# Patient Record
Sex: FEMALE | Race: WHITE | NOT HISPANIC OR LATINO | URBAN - METROPOLITAN AREA
[De-identification: names, ages, dates, MRNs, and addresses within clinical notes are randomized per-mention and may not be internally consistent; named-entity substitution may affect disease eponyms.]

---

## 2021-05-24 ENCOUNTER — LAB OUTSIDE AN ENCOUNTER (OUTPATIENT)
Dept: URBAN - METROPOLITAN AREA CLINIC 19 | Facility: CLINIC | Age: 71
End: 2021-05-24

## 2021-05-24 ENCOUNTER — WEB ENCOUNTER (OUTPATIENT)
Dept: URBAN - METROPOLITAN AREA CLINIC 19 | Facility: CLINIC | Age: 71
End: 2021-05-24

## 2021-05-24 ENCOUNTER — OFFICE VISIT (OUTPATIENT)
Dept: URBAN - METROPOLITAN AREA CLINIC 19 | Facility: CLINIC | Age: 71
End: 2021-05-24
Payer: MEDICARE

## 2021-05-24 DIAGNOSIS — R19.7 DIARRHEA: ICD-10-CM

## 2021-05-24 DIAGNOSIS — Z80.0 FAMILY HISTORY OF COLON CANCER: ICD-10-CM

## 2021-05-24 DIAGNOSIS — R10.32 LLQ PAIN: ICD-10-CM

## 2021-05-24 DIAGNOSIS — R11.0 NAUSEA: ICD-10-CM

## 2021-05-24 DIAGNOSIS — E66.01 MORBID OBESITY: ICD-10-CM

## 2021-05-24 PROCEDURE — 99203 OFFICE O/P NEW LOW 30 MIN: CPT | Performed by: INTERNAL MEDICINE

## 2021-05-24 RX ORDER — LEVOTHYROXINE SODIUM 100 UG/1
1 TABLET IN THE MORNING ON AN EMPTY STOMACH TABLET ORAL ONCE A DAY
Status: ACTIVE | COMMUNITY

## 2021-05-24 RX ORDER — METFORMIN HYDROCHLORIDE 1000 MG/1
1 TABLET WITH A MEAL TABLET, FILM COATED ORAL ONCE A DAY
Status: ACTIVE | COMMUNITY

## 2021-05-24 RX ORDER — ONDANSETRON HYDROCHLORIDE 4 MG/1
1-2 TABLETS TABLET, FILM COATED ORAL
Qty: 60 | Refills: 1 | OUTPATIENT
Start: 2021-05-24

## 2021-05-24 RX ORDER — AMLODIPINE AND OLMESARTAN MEDOXOMIL 5; 20 MG/1; MG/1
1 TABLET TABLET ORAL ONCE A DAY
Status: ACTIVE | COMMUNITY

## 2021-05-24 RX ORDER — PRAVASTATIN SODIUM 40 MG/1
1 TABLET TABLET ORAL ONCE A DAY
Status: ACTIVE | COMMUNITY

## 2021-05-24 RX ORDER — SERTRALINE 100 MG/1
1 TABLET TABLET, FILM COATED ORAL ONCE A DAY
Status: ACTIVE | COMMUNITY

## 2021-05-24 RX ORDER — SODIUM, POTASSIUM,MAG SULFATES 17.5-3.13G
254 ML SOLUTION, RECONSTITUTED, ORAL ORAL ONCE
Qty: 1 KIT | Refills: 0 | OUTPATIENT
Start: 2021-05-24 | End: 2021-05-25

## 2021-05-24 RX ORDER — FUROSEMIDE 40 MG/1
1 TABLET TABLET ORAL ONCE A DAY
Status: ACTIVE | COMMUNITY

## 2021-05-24 NOTE — PHYSICAL EXAM CHEST:
no lesions,  no deformities,  no traumatic injuries, chest wall non-tender,  no masses present, breathing is unlabored without accessory muscle use, normal breath sounds
Yes

## 2021-05-24 NOTE — HPI-TODAY'S VISIT:
5/16/14 (Dr. Sadia Larson):  The patient is a 63-year-old woman here for complaints of left lower quadrant abdominal pain and hematochezia. She had a colonoscopy in 2007 by Dr. Kamaljit Morgan. It showed mild diverticulosis in the sigmoid colon and grade 2 internal hemorrhoids were found. The patient has had chronic left lower quadrant abdominal pain for over a year. She also had an attack of diverticulitis a couple of years ago when she was on  antibiotics orally for a week. Over the past year, she has also noticed her stools are thinner and  she has some discomfort in the left lower quadrant immediately before and after defecation. The pain is constant and gets worse with bowel movements. She denies any fevers or chills recently.  She had left knee replacement in April 2014 and at that time, her stools became harder. She has  also been on narcotics as well as NSAIDs, namely Voltaren as well as Aleve. She is still on both, but she has been cut back on her narcotics as well as NSAIDs.   The patient is quite concerned about any colonoscopy procedures since she developed an acute  right-sided hemiplegia the day after her colonoscopy and she was diagnosed with an ischemic stroke. She was found to have carotid artery stenosis with atheromatous plaques. She has been  on aspirin since then. She underwent her knee surgery under general anesthesia last month and  did not develop any neurological symptoms. She was able to stop her aspirin safely without any issues. However, her general anesthesia was complicated by hypotension as well as hypoxia that stabilized after few hours as per the patient.  5/25/21:  On 6/4/14, Dr. Larson performed a colonoscopy at the hospital that showed diverticulosis and hemorrhoids but no other lesions.  There were no complications.  She presents today for a 1-week history of diarrhea, usually after eating.  This has been associated with LLQ/left flank pain.  She has had some nausea.  Of note, Dr. Nj performed an EGD on 6/11/14 that showed some gastritis/peptic ulcers - thought to be secondary to bile reflux.  There was also some evidence of GERD.  Patient has been on a PPI for the past 2 weeks.  She is due for a screening colonoscopy due to a family history of colon cancer.

## 2021-05-26 LAB
A/G RATIO: 2.2
ALBUMIN: 4.7
ALKALINE PHOSPHATASE: 135
ALT (SGPT): 26
AST (SGOT): 23
BILIRUBIN, TOTAL: 0.5
BUN/CREATININE RATIO: 17
BUN: 16
C-REACTIVE PROTEIN, QUANT: 2
CALCIUM: 9.5
CARBON DIOXIDE, TOTAL: 22
CHLORIDE: 104
CREATININE: 0.92
EGFR IF AFRICN AM: 74
EGFR IF NONAFRICN AM: 64
ENDOMYSIAL ANTIBODY IGA: NEGATIVE
GLOBULIN, TOTAL: 2.1
GLUCOSE: 97
HEMATOCRIT: 38.6
HEMOGLOBIN: 12.5
IMMUNOGLOBULIN A, QN, SERUM: 206
MCH: 26.2
MCHC: 32.4
MCV: 81
NRBC: (no result)
PLATELETS: 369
POTASSIUM: 4.7
PROTEIN, TOTAL: 6.8
RBC: 4.78
RDW: 15.8
SEDIMENTATION RATE-WESTERGREN: 18
SODIUM: 141
T-TRANSGLUTAMINASE (TTG) IGA: <2
WBC: 9.7

## 2021-05-27 LAB — GASTROINTESTINAL PATHOGEN: (no result)

## 2021-06-09 ENCOUNTER — OFFICE VISIT (OUTPATIENT)
Dept: URBAN - METROPOLITAN AREA MEDICAL CENTER 25 | Facility: MEDICAL CENTER | Age: 71
End: 2021-06-09

## 2021-06-09 ENCOUNTER — OFFICE VISIT (OUTPATIENT)
Dept: URBAN - METROPOLITAN AREA MEDICAL CENTER 25 | Facility: MEDICAL CENTER | Age: 71
End: 2021-06-09
Payer: MEDICARE

## 2021-06-09 DIAGNOSIS — K63.89 BACTERIAL OVERGROWTH SYNDROME: ICD-10-CM

## 2021-06-09 DIAGNOSIS — Z80.0 FAMILY HISTORY MALIGNANT NEOPLASM OF BILIARY TRACT: ICD-10-CM

## 2021-06-09 PROCEDURE — 45380 COLONOSCOPY AND BIOPSY: CPT | Performed by: INTERNAL MEDICINE

## 2021-06-09 RX ORDER — FUROSEMIDE 40 MG/1
1 TABLET TABLET ORAL ONCE A DAY
Status: ACTIVE | COMMUNITY

## 2021-06-09 RX ORDER — METFORMIN HYDROCHLORIDE 1000 MG/1
1 TABLET WITH A MEAL TABLET, FILM COATED ORAL ONCE A DAY
Status: ACTIVE | COMMUNITY

## 2021-06-09 RX ORDER — ONDANSETRON HYDROCHLORIDE 4 MG/1
1-2 TABLETS TABLET, FILM COATED ORAL
Qty: 60 | Refills: 1 | Status: ACTIVE | COMMUNITY
Start: 2021-05-24

## 2021-06-09 RX ORDER — SERTRALINE 100 MG/1
1 TABLET TABLET, FILM COATED ORAL ONCE A DAY
Status: ACTIVE | COMMUNITY

## 2021-06-09 RX ORDER — LEVOTHYROXINE SODIUM 100 UG/1
1 TABLET IN THE MORNING ON AN EMPTY STOMACH TABLET ORAL ONCE A DAY
Status: ACTIVE | COMMUNITY

## 2021-06-09 RX ORDER — AMLODIPINE AND OLMESARTAN MEDOXOMIL 5; 20 MG/1; MG/1
1 TABLET TABLET ORAL ONCE A DAY
Status: ACTIVE | COMMUNITY

## 2021-06-09 RX ORDER — PRAVASTATIN SODIUM 40 MG/1
1 TABLET TABLET ORAL ONCE A DAY
Status: ACTIVE | COMMUNITY

## 2021-06-14 ENCOUNTER — TELEPHONE ENCOUNTER (OUTPATIENT)
Dept: URBAN - METROPOLITAN AREA CLINIC 19 | Facility: CLINIC | Age: 71
End: 2021-06-14

## 2021-06-14 RX ORDER — ONDANSETRON HYDROCHLORIDE 4 MG/1
1-2 TABLETS TABLET, FILM COATED ORAL
Qty: 60 | Refills: 1 | Status: ACTIVE | COMMUNITY
Start: 2021-05-24

## 2021-06-14 RX ORDER — PRAVASTATIN SODIUM 40 MG/1
1 TABLET TABLET ORAL ONCE A DAY
Status: ACTIVE | COMMUNITY

## 2021-06-14 RX ORDER — LEVOTHYROXINE SODIUM 100 UG/1
1 TABLET IN THE MORNING ON AN EMPTY STOMACH TABLET ORAL ONCE A DAY
Status: ACTIVE | COMMUNITY

## 2021-06-14 RX ORDER — AMLODIPINE AND OLMESARTAN MEDOXOMIL 5; 20 MG/1; MG/1
1 TABLET TABLET ORAL ONCE A DAY
Status: ACTIVE | COMMUNITY

## 2021-06-14 RX ORDER — METFORMIN HYDROCHLORIDE 1000 MG/1
1 TABLET WITH A MEAL TABLET, FILM COATED ORAL ONCE A DAY
Status: ACTIVE | COMMUNITY

## 2021-06-14 RX ORDER — RIFAXIMIN 550 MG/1
1 TABLET TABLET ORAL THREE TIMES A DAY
Qty: 42 TABLET | Refills: 0 | OUTPATIENT
Start: 2021-06-22 | End: 2021-07-06

## 2021-06-14 RX ORDER — SERTRALINE 100 MG/1
1 TABLET TABLET, FILM COATED ORAL ONCE A DAY
Status: ACTIVE | COMMUNITY

## 2021-06-14 RX ORDER — FUROSEMIDE 40 MG/1
1 TABLET TABLET ORAL ONCE A DAY
Status: ACTIVE | COMMUNITY

## 2021-08-23 ENCOUNTER — OFFICE VISIT (OUTPATIENT)
Dept: URBAN - METROPOLITAN AREA CLINIC 19 | Facility: CLINIC | Age: 71
End: 2021-08-23
Payer: MEDICARE

## 2021-08-23 ENCOUNTER — TELEPHONE ENCOUNTER (OUTPATIENT)
Dept: URBAN - METROPOLITAN AREA CLINIC 19 | Facility: CLINIC | Age: 71
End: 2021-08-23

## 2021-08-23 DIAGNOSIS — R19.7 DIARRHEA: ICD-10-CM

## 2021-08-23 DIAGNOSIS — R10.32 LLQ PAIN: ICD-10-CM

## 2021-08-23 DIAGNOSIS — R10.2 SUPRAPUBIC ABDOMINAL PAIN: ICD-10-CM

## 2021-08-23 DIAGNOSIS — K21.9 GERD: ICD-10-CM

## 2021-08-23 DIAGNOSIS — E66.01 MORBID OBESITY: ICD-10-CM

## 2021-08-23 DIAGNOSIS — K58.9 IBS (IRRITABLE BOWEL SYNDROME)-DIARRHEA: ICD-10-CM

## 2021-08-23 DIAGNOSIS — Z80.0 FAMILY HISTORY OF COLON CANCER: ICD-10-CM

## 2021-08-23 PROBLEM — 301716002 LEFT LOWER QUADRANT PAIN: Status: ACTIVE | Noted: 2021-05-24

## 2021-08-23 PROBLEM — 162053006 SUPRAPUBIC PAIN: Status: ACTIVE | Noted: 2021-08-23

## 2021-08-23 PROBLEM — 10743008 IRRITABLE BOWEL SYNDROME: Status: ACTIVE | Noted: 2021-06-22

## 2021-08-23 PROBLEM — 235595009 GASTROESOPHAGEAL REFLUX DISEASE: Status: ACTIVE | Noted: 2021-08-23

## 2021-08-23 PROBLEM — 62315008 DIARRHEA: Status: ACTIVE | Noted: 2021-05-24

## 2021-08-23 PROBLEM — 238136002 MORBID OBESITY: Status: ACTIVE | Noted: 2021-05-24

## 2021-08-23 PROCEDURE — 99214 OFFICE O/P EST MOD 30 MIN: CPT | Performed by: INTERNAL MEDICINE

## 2021-08-23 RX ORDER — ONDANSETRON HYDROCHLORIDE 4 MG/1
1-2 TABLETS TABLET, FILM COATED ORAL
Qty: 60 | Refills: 1 | Status: ACTIVE | COMMUNITY
Start: 2021-05-24

## 2021-08-23 RX ORDER — METFORMIN HYDROCHLORIDE 1000 MG/1
1 TABLET WITH A MEAL TABLET, FILM COATED ORAL ONCE A DAY
Status: ACTIVE | COMMUNITY

## 2021-08-23 RX ORDER — PRAVASTATIN SODIUM 40 MG/1
1 TABLET TABLET ORAL ONCE A DAY
Status: ACTIVE | COMMUNITY

## 2021-08-23 RX ORDER — DICYCLOMINE HYDROCHLORIDE 10 MG/1
1 CAPSULE CAPSULE ORAL THREE TIMES A DAY
Qty: 270 CAPSULE | Refills: 1 | OUTPATIENT
Start: 2021-08-23 | End: 2022-02-18

## 2021-08-23 RX ORDER — SERTRALINE 100 MG/1
1 TABLET TABLET, FILM COATED ORAL ONCE A DAY
Status: ACTIVE | COMMUNITY

## 2021-08-23 RX ORDER — AMLODIPINE AND OLMESARTAN MEDOXOMIL 5; 20 MG/1; MG/1
1 TABLET TABLET ORAL ONCE A DAY
Status: ACTIVE | COMMUNITY

## 2021-08-23 RX ORDER — LEVOTHYROXINE SODIUM 100 UG/1
1 TABLET IN THE MORNING ON AN EMPTY STOMACH TABLET ORAL ONCE A DAY
Status: ACTIVE | COMMUNITY

## 2021-08-23 RX ORDER — FAMOTIDINE 40 MG/1
1 TABLET AT BEDTIME TABLET, FILM COATED ORAL TWICE A DAY
Qty: 180 TABLET | Refills: 1 | OUTPATIENT
Start: 2021-08-23

## 2021-08-23 RX ORDER — FUROSEMIDE 40 MG/1
1 TABLET TABLET ORAL ONCE A DAY
Status: ACTIVE | COMMUNITY

## 2021-08-23 NOTE — HPI-TODAY'S VISIT:
5/16/14 (Dr. Sadia Larson):  The patient is a 63-year-old woman here for complaints of left lower quadrant abdominal pain and hematochezia. She had a colonoscopy in 2007 by Dr. Kamaljit Morgan. It showed mild diverticulosis in the sigmoid colon and grade 2 internal hemorrhoids were found. The patient has had chronic left lower quadrant abdominal pain for over a year. She also had an attack of diverticulitis a couple of years ago when she was on  antibiotics orally for a week. Over the past year, she has also noticed her stools are thinner and  she has some discomfort in the left lower quadrant immediately before and after defecation. The pain is constant and gets worse with bowel movements. She denies any fevers or chills recently.  She had left knee replacement in April 2014 and at that time, her stools became harder. She has  also been on narcotics as well as NSAIDs, namely Voltaren as well as Aleve. She is still on both, but she has been cut back on her narcotics as well as NSAIDs.   The patient is quite concerned about any colonoscopy procedures since she developed an acute  right-sided hemiplegia the day after her colonoscopy and she was diagnosed with an ischemic stroke. She was found to have carotid artery stenosis with atheromatous plaques. She has been  on aspirin since then. She underwent her knee surgery under general anesthesia last month and  did not develop any neurological symptoms. She was able to stop her aspirin safely without any issues. However, her general anesthesia was complicated by hypotension as well as hypoxia that stabilized after few hours as per the patient.  5/25/21:  On 6/4/14, Dr. Larson performed a colonoscopy at the hospital that showed diverticulosis and hemorrhoids but no other lesions.  There were no complications.  She presents today for a 1-week history of diarrhea, usually after eating.  This has been associated with LLQ/left flank pain.  She has had some nausea.  Of note, Dr. Nj performed an EGD on 6/11/14 that showed some gastritis/peptic ulcers - thought to be secondary to bile reflux.  There was also some evidence of GERD.  Patient has been on a PPI for the past 2 weeks.  She is due for a screening colonoscopy due to a family history of colon cancer.  8/23/21:  On 6/9/21, I performed a colonoscopy that revealed sigmoid diverticular disease and two benign colon polyps.  Patient presents for reevaluation of her GI symptoms.  She actually had some relief after she took the prep for the colonoscopy, but she tried the Xifaxan that I prescribed without any real change in her symptoms.  She still has intermittent LLQ/suprapubic pain, and she has urinary frequency, most likely exacerbated by her diuretics, which she takes for severe lower extremity edema.  She has some reflux symptoms, but as long as she takes Protonix, she is OK.  She was told recently to switch to Pepcid due to concerns about long-term use of PPIs.

## 2021-08-28 LAB
ANTIMICROBIAL SUSCEPTIBILITY: (no result)
APPEARANCE: CLEAR
BACTERIA: (no result)
BILIRUBIN: NEGATIVE
CAST TYPE: (no result)
CASTS: (no result)
COMMENT: (no result)
CRYSTAL TYPE: (no result)
CRYSTALS: (no result)
EPITHELIAL CELLS (NON RENAL): (no result)
EPITHELIAL CELLS (RENAL): (no result)
GLUCOSE: NEGATIVE
KETONES: NEGATIVE
Lab: (no result)
MICROSCOPIC EXAMINATION: (no result)
MICROSCOPIC EXAMINATION: (no result)
MUCUS THREADS: (no result)
NITRITE, URINE: NEGATIVE
OCCULT BLOOD: NEGATIVE
PH: 5.5
PROTEIN: NEGATIVE
RBC: (no result)
SPECIFIC GRAVITY: 1.01
TRICHOMONAS: (no result)
URINALYSIS REFLEX: (no result)
URINE CULTURE, ROUTINE: (no result)
URINE-COLOR: YELLOW
UROBILINOGEN,SEMI-QN: 0.2
WBC ESTERASE: (no result)
WBC: (no result)
YEAST: (no result)

## 2021-09-02 ENCOUNTER — TELEPHONE ENCOUNTER (OUTPATIENT)
Dept: URBAN - METROPOLITAN AREA CLINIC 19 | Facility: CLINIC | Age: 71
End: 2021-09-02

## 2021-09-02 PROBLEM — 68566005 URINARY TRACT INFECTION: Status: ACTIVE | Noted: 2021-09-02

## 2021-09-02 RX ORDER — PRAVASTATIN SODIUM 40 MG/1
1 TABLET TABLET ORAL ONCE A DAY
Status: ACTIVE | COMMUNITY

## 2021-09-02 RX ORDER — NITROFURANTOIN MONOHYDRATE/MACROCRYSTALLINE 25; 75 MG/1; MG/1
1 CAPSULE WITH FOOD CAPSULE ORAL
Qty: 10 | Refills: 0 | OUTPATIENT
Start: 2021-09-02 | End: 2021-09-07

## 2021-09-02 RX ORDER — SERTRALINE 100 MG/1
1 TABLET TABLET, FILM COATED ORAL ONCE A DAY
Status: ACTIVE | COMMUNITY

## 2021-09-02 RX ORDER — FAMOTIDINE 40 MG/1
1 TABLET AT BEDTIME TABLET, FILM COATED ORAL TWICE A DAY
Qty: 180 TABLET | Refills: 1 | Status: ACTIVE | COMMUNITY
Start: 2021-08-23

## 2021-09-02 RX ORDER — DICYCLOMINE HYDROCHLORIDE 10 MG/1
1 CAPSULE CAPSULE ORAL THREE TIMES A DAY
Qty: 270 CAPSULE | Refills: 1 | Status: ACTIVE | COMMUNITY
Start: 2021-08-23 | End: 2022-02-18

## 2021-09-02 RX ORDER — METFORMIN HYDROCHLORIDE 1000 MG/1
1 TABLET WITH A MEAL TABLET, FILM COATED ORAL ONCE A DAY
Status: ACTIVE | COMMUNITY

## 2021-09-02 RX ORDER — ONDANSETRON HYDROCHLORIDE 4 MG/1
1-2 TABLETS TABLET, FILM COATED ORAL
Qty: 60 | Refills: 1 | Status: ACTIVE | COMMUNITY
Start: 2021-05-24

## 2021-09-02 RX ORDER — FUROSEMIDE 40 MG/1
1 TABLET TABLET ORAL ONCE A DAY
Status: ACTIVE | COMMUNITY

## 2021-09-02 RX ORDER — AMLODIPINE AND OLMESARTAN MEDOXOMIL 5; 20 MG/1; MG/1
1 TABLET TABLET ORAL ONCE A DAY
Status: ACTIVE | COMMUNITY

## 2021-09-02 RX ORDER — LEVOTHYROXINE SODIUM 100 UG/1
1 TABLET IN THE MORNING ON AN EMPTY STOMACH TABLET ORAL ONCE A DAY
Status: ACTIVE | COMMUNITY

## 2022-04-08 ENCOUNTER — OFFICE VISIT (OUTPATIENT)
Dept: URBAN - METROPOLITAN AREA CLINIC 19 | Facility: CLINIC | Age: 72
End: 2022-04-08
Payer: MEDICARE

## 2022-04-08 DIAGNOSIS — R10.12 LEFT UPPER QUADRANT PAIN: ICD-10-CM

## 2022-04-08 DIAGNOSIS — K21.9 GASTROESOPHAGEAL REFLUX DISEASE, UNSPECIFIED WHETHER ESOPHAGITIS PRESENT: ICD-10-CM

## 2022-04-08 DIAGNOSIS — R19.8 ALTERNATING CONSTIPATION AND DIARRHEA: ICD-10-CM

## 2022-04-08 PROCEDURE — 99214 OFFICE O/P EST MOD 30 MIN: CPT | Performed by: NURSE PRACTITIONER

## 2022-04-08 RX ORDER — AMLODIPINE AND OLMESARTAN MEDOXOMIL 5; 20 MG/1; MG/1
1 TABLET TABLET ORAL ONCE A DAY
Status: ACTIVE | COMMUNITY

## 2022-04-08 RX ORDER — PRAVASTATIN SODIUM 40 MG/1
1 TABLET TABLET ORAL ONCE A DAY
Status: ACTIVE | COMMUNITY

## 2022-04-08 RX ORDER — METFORMIN HYDROCHLORIDE 1000 MG/1
1 TABLET WITH A MEAL TABLET, FILM COATED ORAL ONCE A DAY
Status: ACTIVE | COMMUNITY

## 2022-04-08 RX ORDER — FAMOTIDINE 40 MG/1
1 TABLET TABLET, FILM COATED ORAL TWICE A DAY
Qty: 180 TABLET | Refills: 1 | OUTPATIENT
Start: 2022-04-08

## 2022-04-08 RX ORDER — ONDANSETRON HYDROCHLORIDE 4 MG/1
1-2 TABLETS TABLET, FILM COATED ORAL
Qty: 60 | Refills: 1 | Status: ACTIVE | COMMUNITY
Start: 2021-05-24

## 2022-04-08 RX ORDER — FAMOTIDINE 40 MG/1
1 TABLET AT BEDTIME TABLET, FILM COATED ORAL TWICE A DAY
Qty: 180 TABLET | Refills: 1 | Status: ACTIVE | COMMUNITY
Start: 2021-08-23

## 2022-04-08 RX ORDER — FUROSEMIDE 40 MG/1
1 TABLET TABLET ORAL ONCE A DAY
Status: ACTIVE | COMMUNITY

## 2022-04-08 RX ORDER — LEVOTHYROXINE SODIUM 100 UG/1
1 TABLET IN THE MORNING ON AN EMPTY STOMACH TABLET ORAL ONCE A DAY
Status: ACTIVE | COMMUNITY

## 2022-04-08 RX ORDER — SERTRALINE 100 MG/1
1 TABLET TABLET, FILM COATED ORAL ONCE A DAY
Status: ACTIVE | COMMUNITY

## 2022-04-08 NOTE — HPI-TODAY'S VISIT:
5/16/14 (Dr. Sadia Larson):  The patient is a 63-year-old woman here for complaints of left lower quadrant abdominal pain and hematochezia. She had a colonoscopy in 2007 by Dr. Kamaljit Morgan. It showed mild diverticulosis in the sigmoid colon and grade 2 internal hemorrhoids were found. The patient has had chronic left lower quadrant abdominal pain for over a year. She also had an attack of diverticulitis a couple of years ago when she was on  antibiotics orally for a week. Over the past year, she has also noticed her stools are thinner and  she has some discomfort in the left lower quadrant immediately before and after defecation. The pain is constant and gets worse with bowel movements. She denies any fevers or chills recently.  She had left knee replacement in April 2014 and at that time, her stools became harder. She has  also been on narcotics as well as NSAIDs, namely Voltaren as well as Aleve. She is still on both, but she has been cut back on her narcotics as well as NSAIDs.   The patient is quite concerned about any colonoscopy procedures since she developed an acute  right-sided hemiplegia the day after her colonoscopy and she was diagnosed with an ischemic stroke. She was found to have carotid artery stenosis with atheromatous plaques. She has been  on aspirin since then. She underwent her knee surgery under general anesthesia last month and  did not develop any neurological symptoms. She was able to stop her aspirin safely without any issues. However, her general anesthesia was complicated by hypotension as well as hypoxia that stabilized after few hours as per the patient.  5/25/21:  On 6/4/14, Dr. Larson performed a colonoscopy at the hospital that showed diverticulosis and hemorrhoids but no other lesions.  There were no complications.  She presents today for a 1-week history of diarrhea, usually after eating.  This has been associated with LLQ/left flank pain.  She has had some nausea.  Of note, Dr. Nj performed an EGD on 6/11/14 that showed some gastritis/peptic ulcers - thought to be secondary to bile reflux.  There was also some evidence of GERD.  Patient has been on a PPI for the past 2 weeks.  She is due for a screening colonoscopy due to a family history of colon cancer.  8/23/21 (Dr. Ulloa):  On 6/9/21, I performed a colonoscopy that revealed sigmoid diverticular disease and two benign colon polyps.  Patient presents for reevaluation of her GI symptoms.  She actually had some relief after she took the prep for the colonoscopy, but she tried the Xifaxan that I prescribed without any real change in her symptoms.  She still has intermittent LLQ/suprapubic pain, and she has urinary frequency, most likely exacerbated by her diuretics, which she takes for severe lower extremity edema.  She has some reflux symptoms, but as long as she takes Protonix, she is OK.  She was told recently to switch to Pepcid due to concerns about long-term use of PPIs.  4/8/22: Ms. Reyes presents today for abdominal pain with constipation/diarrhea.  Patient reports she feels like she has a small stick pointing out of the epigastric area of her abdomen.  She reports her entire stomach is sore but pain is the worst in the left upper quadrant.  Patient states she had diverticulitis 8 years ago.  Patient reports she feels like she always has to have a bowel movement and she strains a lot.  She reports her stool is loose when she has a bowel movement.  She states she has bowel movement almost every day.  Patient also does reports she occasionally has accidents.  Patient is currently taking Pepcid 40 mg in the morning and Tums before bed.  She reports she still having issues with reflux at night.

## 2022-04-08 NOTE — PHYSICAL EXAM GASTROINTESTINAL
Abdomen, soft, LUQ tenderness, nondistended, no guarding or rigidity, no masses palpable, normal bowel sounds, Liver and Spleen, no hepatomegaly present, no hepatosplenomegaly,  Rectal, deferred

## 2022-04-11 ENCOUNTER — LAB OUTSIDE AN ENCOUNTER (OUTPATIENT)
Dept: URBAN - METROPOLITAN AREA CLINIC 19 | Facility: CLINIC | Age: 72
End: 2022-04-11

## 2022-04-11 LAB
CREATININE POC: 1
PERFORMING LAB: (no result)

## 2022-05-12 ENCOUNTER — LAB OUTSIDE AN ENCOUNTER (OUTPATIENT)
Dept: URBAN - METROPOLITAN AREA CLINIC 19 | Facility: CLINIC | Age: 72
End: 2022-05-12

## 2022-05-12 ENCOUNTER — OFFICE VISIT (OUTPATIENT)
Dept: URBAN - METROPOLITAN AREA CLINIC 19 | Facility: CLINIC | Age: 72
End: 2022-05-12
Payer: MEDICARE

## 2022-05-12 VITALS
TEMPERATURE: 96.4 F | DIASTOLIC BLOOD PRESSURE: 88 MMHG | WEIGHT: 282.4 LBS | SYSTOLIC BLOOD PRESSURE: 144 MMHG | HEIGHT: 65 IN | BODY MASS INDEX: 47.05 KG/M2

## 2022-05-12 DIAGNOSIS — K21.9 GASTROESOPHAGEAL REFLUX DISEASE, UNSPECIFIED WHETHER ESOPHAGITIS PRESENT: ICD-10-CM

## 2022-05-12 DIAGNOSIS — R11.0 NAUSEA: ICD-10-CM

## 2022-05-12 DIAGNOSIS — R10.12 LUQ PAIN: ICD-10-CM

## 2022-05-12 DIAGNOSIS — R59.0 LYMPHADENOPATHY, RETROPERITONEAL: ICD-10-CM

## 2022-05-12 DIAGNOSIS — K59.09 CONSTIPATION: ICD-10-CM

## 2022-05-12 PROCEDURE — 99214 OFFICE O/P EST MOD 30 MIN: CPT | Performed by: NURSE PRACTITIONER

## 2022-05-12 RX ORDER — HYOSCYAMINE SULFATE 0.125 MG
1 TABLET ON THE TONGUE AND ALLOW TO DISSOLVE  AS NEEDED TABLET,DISINTEGRATING ORAL EVERY 6 HOURS
Qty: 40 | Refills: 1 | OUTPATIENT
Start: 2022-05-12 | End: 2022-06-01

## 2022-05-12 RX ORDER — ONDANSETRON 4 MG/1
1 TABLET ON THE TONGUE AND ALLOW TO DISSOLVE TABLET, ORALLY DISINTEGRATING ORAL
Qty: 20 | OUTPATIENT
Start: 2022-05-12

## 2022-05-12 RX ORDER — LEVOTHYROXINE SODIUM 100 UG/1
1 TABLET IN THE MORNING ON AN EMPTY STOMACH TABLET ORAL ONCE A DAY
Status: ACTIVE | COMMUNITY

## 2022-05-12 RX ORDER — FUROSEMIDE 40 MG/1
1 TABLET TABLET ORAL ONCE A DAY
Status: ACTIVE | COMMUNITY

## 2022-05-12 RX ORDER — PANTOPRAZOLE SODIUM 40 MG/1
1 TABLET TABLET, DELAYED RELEASE ORAL ONCE A DAY
Qty: 90 TABLET | Refills: 1 | OUTPATIENT
Start: 2022-05-12

## 2022-05-12 RX ORDER — METFORMIN HYDROCHLORIDE 1000 MG/1
1 TABLET WITH A MEAL TABLET, FILM COATED ORAL ONCE A DAY
Status: ACTIVE | COMMUNITY

## 2022-05-12 RX ORDER — FAMOTIDINE 40 MG/1
1 TABLET TABLET, FILM COATED ORAL TWICE A DAY
Qty: 180 TABLET | Refills: 1 | Status: ACTIVE | COMMUNITY
Start: 2022-04-08

## 2022-05-12 RX ORDER — ONDANSETRON HYDROCHLORIDE 4 MG/1
1-2 TABLETS TABLET, FILM COATED ORAL
Qty: 60 | Refills: 1 | Status: ACTIVE | COMMUNITY
Start: 2021-05-24

## 2022-05-12 RX ORDER — PRAVASTATIN SODIUM 40 MG/1
1 TABLET TABLET ORAL ONCE A DAY
Status: ACTIVE | COMMUNITY

## 2022-05-12 RX ORDER — FAMOTIDINE 40 MG/1
1 TABLET AT BEDTIME TABLET, FILM COATED ORAL TWICE A DAY
Qty: 180 TABLET | Refills: 1 | Status: ACTIVE | COMMUNITY
Start: 2021-08-23

## 2022-05-12 RX ORDER — AMLODIPINE AND OLMESARTAN MEDOXOMIL 5; 20 MG/1; MG/1
1 TABLET TABLET ORAL ONCE A DAY
Status: ACTIVE | COMMUNITY

## 2022-05-12 RX ORDER — SERTRALINE 100 MG/1
1 TABLET TABLET, FILM COATED ORAL ONCE A DAY
Status: ACTIVE | COMMUNITY

## 2022-05-12 NOTE — HPI-TODAY'S VISIT:
5/16/14 (Dr. Sadia Larson):  The patient is a 63-year-old woman here for complaints of left lower quadrant abdominal pain and hematochezia. She had a colonoscopy in 2007 by Dr. Kamaljit Morgan. It showed mild diverticulosis in the sigmoid colon and grade 2 internal hemorrhoids were found. The patient has had chronic left lower quadrant abdominal pain for over a year. She also had an attack of diverticulitis a couple of years ago when she was on  antibiotics orally for a week. Over the past year, she has also noticed her stools are thinner and  she has some discomfort in the left lower quadrant immediately before and after defecation. The pain is constant and gets worse with bowel movements. She denies any fevers or chills recently.  She had left knee replacement in April 2014 and at that time, her stools became harder. She has  also been on narcotics as well as NSAIDs, namely Voltaren as well as Aleve. She is still on both, but she has been cut back on her narcotics as well as NSAIDs.   The patient is quite concerned about any colonoscopy procedures since she developed an acute  right-sided hemiplegia the day after her colonoscopy and she was diagnosed with an ischemic stroke. She was found to have carotid artery stenosis with atheromatous plaques. She has been  on aspirin since then. She underwent her knee surgery under general anesthesia last month and  did not develop any neurological symptoms. She was able to stop her aspirin safely without any issues. However, her general anesthesia was complicated by hypotension as well as hypoxia that stabilized after few hours as per the patient.  5/25/21:  On 6/4/14, Dr. Larson performed a colonoscopy at the hospital that showed diverticulosis and hemorrhoids but no other lesions.  There were no complications.  She presents today for a 1-week history of diarrhea, usually after eating.  This has been associated with LLQ/left flank pain.  She has had some nausea.  Of note, Dr. Nj performed an EGD on 6/11/14 that showed some gastritis/peptic ulcers - thought to be secondary to bile reflux.  There was also some evidence of GERD.  Patient has been on a PPI for the past 2 weeks.  She is due for a screening colonoscopy due to a family history of colon cancer.  8/23/21 (Dr. Ulloa):  On 6/9/21, I performed a colonoscopy that revealed sigmoid diverticular disease and two benign colon polyps.  Patient presents for reevaluation of her GI symptoms.  She actually had some relief after she took the prep for the colonoscopy, but she tried the Xifaxan that I prescribed without any real change in her symptoms.  She still has intermittent LLQ/suprapubic pain, and she has urinary frequency, most likely exacerbated by her diuretics, which she takes for severe lower extremity edema.  She has some reflux symptoms, but as long as she takes Protonix, she is OK.  She was told recently to switch to Pepcid due to concerns about long-term use of PPIs.  4/8/22: Ms. Reyes presents today for abdominal pain with constipation/diarrhea.  Patient reports she feels like she has a small stick pointing out of the epigastric area of her abdomen.  She reports her entire stomach is sore but pain is the worst in the left upper quadrant.  Patient states she had diverticulitis 8 years ago.  Patient reports she feels like she always has to have a bowel movement and she strains a lot.  She reports her stool is loose when she has a bowel movement.  She states she has bowel movement almost every day.  Patient also does reports she occasionally has accidents.  Patient is currently taking Pepcid 40 mg in the morning and Tums before bed.  She reports she still having issues with reflux at night.  5/12/22: Ms. Reyes is a 71-year-old female who was last seen in GI clinic on 4/8/2022 for left upper quadrant pain.  Patient had a CT scan on 4/11/2022-nonobstructive left renal calculus.  Tiny gallstones.  Diverticulosis without changes of acute diverticulitis.  Nonspecific retroperitoneal lymph nodes without significant enlargement.  These are most likely reactive.  Patient reports she saw a surgeon for the gallstones who advised her that she did not need her gallbladder removed and it was not likely to be causing her pain.  Patient reports the pain is left upper quadrant and radiates to the middle.  Patient reports she cannot have any tight clothing on it.  Patient reports she also has nausea.  Patient additionally reports she has acid reflux.  She reports last night she had chili and her acid reflux was severe.  Patient has been taking Pepcid 40 mg at bedtime.  Patient also reports she is having constipation right now.  She took MiraLAX last night.  Patient reports she was taking three quarters of a capful for 2 weeks after she was last seen and had some diarrhea.  Patient does report she takes diclofenac once a day every day.  Patient reports her last EGD was many years ago.  Patient denies cardiac/kidney/lung disease, blood thinners, and home O2.  Patient is a diabetic.

## 2022-06-10 PROBLEM — 301715003: Status: ACTIVE | Noted: 2022-04-08

## 2022-06-10 PROBLEM — 422587007 NAUSEA: Status: ACTIVE | Noted: 2021-05-24

## 2022-07-08 ENCOUNTER — OFFICE VISIT (OUTPATIENT)
Dept: URBAN - METROPOLITAN AREA MEDICAL CENTER 25 | Facility: MEDICAL CENTER | Age: 72
End: 2022-07-08
Payer: MEDICARE

## 2022-07-08 ENCOUNTER — LAB OUTSIDE AN ENCOUNTER (OUTPATIENT)
Dept: URBAN - METROPOLITAN AREA CLINIC 19 | Facility: CLINIC | Age: 72
End: 2022-07-08

## 2022-07-08 DIAGNOSIS — K29.60 ADENOPAPILLOMATOSIS GASTRICA: ICD-10-CM

## 2022-07-08 DIAGNOSIS — R10.12 ABDOMINAL BURNING SENSATION IN LEFT UPPER QUADRANT: ICD-10-CM

## 2022-07-08 LAB
GLUCOSE POC: 97
PERFORMING LAB: (no result)

## 2022-07-08 PROCEDURE — 43239 EGD BIOPSY SINGLE/MULTIPLE: CPT | Performed by: INTERNAL MEDICINE

## 2022-07-08 RX ORDER — ONDANSETRON HYDROCHLORIDE 4 MG/1
1-2 TABLETS TABLET, FILM COATED ORAL
Qty: 60 | Refills: 1 | Status: ACTIVE | COMMUNITY
Start: 2021-05-24

## 2022-07-08 RX ORDER — METFORMIN HYDROCHLORIDE 1000 MG/1
1 TABLET WITH A MEAL TABLET, FILM COATED ORAL ONCE A DAY
Status: ACTIVE | COMMUNITY

## 2022-07-08 RX ORDER — LEVOTHYROXINE SODIUM 100 UG/1
1 TABLET IN THE MORNING ON AN EMPTY STOMACH TABLET ORAL ONCE A DAY
Status: ACTIVE | COMMUNITY

## 2022-07-08 RX ORDER — FUROSEMIDE 40 MG/1
1 TABLET TABLET ORAL ONCE A DAY
Status: ACTIVE | COMMUNITY

## 2022-07-08 RX ORDER — FAMOTIDINE 40 MG/1
1 TABLET TABLET, FILM COATED ORAL TWICE A DAY
Qty: 180 TABLET | Refills: 1 | Status: ACTIVE | COMMUNITY
Start: 2022-04-08

## 2022-07-08 RX ORDER — FAMOTIDINE 40 MG/1
1 TABLET AT BEDTIME TABLET, FILM COATED ORAL TWICE A DAY
Qty: 180 TABLET | Refills: 1 | Status: ACTIVE | COMMUNITY
Start: 2021-08-23

## 2022-07-08 RX ORDER — ONDANSETRON 4 MG/1
1 TABLET ON THE TONGUE AND ALLOW TO DISSOLVE TABLET, ORALLY DISINTEGRATING ORAL
Qty: 20 | Status: ACTIVE | COMMUNITY
Start: 2022-05-12

## 2022-07-08 RX ORDER — AMLODIPINE AND OLMESARTAN MEDOXOMIL 5; 20 MG/1; MG/1
1 TABLET TABLET ORAL ONCE A DAY
Status: ACTIVE | COMMUNITY

## 2022-07-08 RX ORDER — PRAVASTATIN SODIUM 40 MG/1
1 TABLET TABLET ORAL ONCE A DAY
Status: ACTIVE | COMMUNITY

## 2022-07-08 RX ORDER — PANTOPRAZOLE SODIUM 40 MG/1
1 TABLET TABLET, DELAYED RELEASE ORAL ONCE A DAY
Qty: 90 TABLET | Refills: 1 | Status: ACTIVE | COMMUNITY
Start: 2022-05-12

## 2022-07-08 RX ORDER — SERTRALINE 100 MG/1
1 TABLET TABLET, FILM COATED ORAL ONCE A DAY
Status: ACTIVE | COMMUNITY

## 2022-10-03 ENCOUNTER — OFFICE VISIT (OUTPATIENT)
Dept: URBAN - METROPOLITAN AREA CLINIC 19 | Facility: CLINIC | Age: 72
End: 2022-10-03
Payer: MEDICARE

## 2022-10-03 VITALS
HEART RATE: 75 BPM | HEIGHT: 65 IN | TEMPERATURE: 98 F | WEIGHT: 293 LBS | DIASTOLIC BLOOD PRESSURE: 74 MMHG | SYSTOLIC BLOOD PRESSURE: 146 MMHG | BODY MASS INDEX: 48.82 KG/M2

## 2022-10-03 DIAGNOSIS — K21.9 GASTROESOPHAGEAL REFLUX DISEASE, UNSPECIFIED WHETHER ESOPHAGITIS PRESENT: ICD-10-CM

## 2022-10-03 DIAGNOSIS — Z80.0 FAMILY HISTORY OF COLON CANCER: ICD-10-CM

## 2022-10-03 DIAGNOSIS — K60.2 ANAL FISSURE: ICD-10-CM

## 2022-10-03 DIAGNOSIS — K59.09 CHANGE IN BOWEL MOVEMENTS INTERMITTENT CONSTIPATION. URGENCY IN THE MORNING.: ICD-10-CM

## 2022-10-03 PROCEDURE — 99213 OFFICE O/P EST LOW 20 MIN: CPT | Performed by: INTERNAL MEDICINE

## 2022-10-03 PROCEDURE — 46611 ANOSCOPY: CPT | Performed by: INTERNAL MEDICINE

## 2022-10-03 PROCEDURE — 46600 DIAGNOSTIC ANOSCOPY SPX: CPT | Performed by: INTERNAL MEDICINE

## 2022-10-03 RX ORDER — ONDANSETRON 4 MG/1
1 TABLET ON THE TONGUE AND ALLOW TO DISSOLVE TABLET, ORALLY DISINTEGRATING ORAL
Qty: 20 | Status: ACTIVE | COMMUNITY
Start: 2022-05-12

## 2022-10-03 RX ORDER — PANTOPRAZOLE SODIUM 40 MG/1
1 TABLET TABLET, DELAYED RELEASE ORAL ONCE A DAY
Qty: 90 TABLET | Refills: 1 | Status: ACTIVE | COMMUNITY
Start: 2022-05-12

## 2022-10-03 RX ORDER — LEVOTHYROXINE SODIUM 100 UG/1
1 TABLET IN THE MORNING ON AN EMPTY STOMACH TABLET ORAL ONCE A DAY
Status: ACTIVE | COMMUNITY

## 2022-10-03 RX ORDER — ONDANSETRON HYDROCHLORIDE 4 MG/1
1-2 TABLETS TABLET, FILM COATED ORAL
Qty: 60 | Refills: 1 | Status: ACTIVE | COMMUNITY
Start: 2021-05-24

## 2022-10-03 RX ORDER — PRAVASTATIN SODIUM 40 MG/1
1 TABLET TABLET ORAL ONCE A DAY
Status: ACTIVE | COMMUNITY

## 2022-10-03 RX ORDER — FAMOTIDINE 40 MG/1
1 TABLET AT BEDTIME TABLET, FILM COATED ORAL TWICE A DAY
Qty: 180 TABLET | Refills: 1 | Status: ACTIVE | COMMUNITY
Start: 2021-08-23

## 2022-10-03 RX ORDER — FAMOTIDINE 40 MG/1
1 TABLET TABLET, FILM COATED ORAL TWICE A DAY
Qty: 180 TABLET | Refills: 1 | Status: ACTIVE | COMMUNITY
Start: 2022-04-08

## 2022-10-03 RX ORDER — METFORMIN HYDROCHLORIDE 1000 MG/1
1 TABLET WITH A MEAL TABLET, FILM COATED ORAL ONCE A DAY
Status: ACTIVE | COMMUNITY

## 2022-10-03 RX ORDER — SERTRALINE 100 MG/1
1 TABLET TABLET, FILM COATED ORAL ONCE A DAY
Status: ACTIVE | COMMUNITY

## 2022-10-03 RX ORDER — FUROSEMIDE 40 MG/1
1 TABLET TABLET ORAL ONCE A DAY
Status: ACTIVE | COMMUNITY

## 2022-10-03 RX ORDER — AMLODIPINE AND OLMESARTAN MEDOXOMIL 5; 20 MG/1; MG/1
1 TABLET TABLET ORAL ONCE A DAY
Status: ACTIVE | COMMUNITY

## 2022-10-03 NOTE — HPI-TODAY'S VISIT:
5/16/14 (Dr. Sadia Larson):  The patient is a 63-year-old woman here for complaints of left lower quadrant abdominal pain and hematochezia. She had a colonoscopy in 2007 by Dr. Kamaljit Morgan. It showed mild diverticulosis in the sigmoid colon and grade 2 internal hemorrhoids were found. The patient has had chronic left lower quadrant abdominal pain for over a year. She also had an attack of diverticulitis a couple of years ago when she was on  antibiotics orally for a week. Over the past year, she has also noticed her stools are thinner and  she has some discomfort in the left lower quadrant immediately before and after defecation. The pain is constant and gets worse with bowel movements. She denies any fevers or chills recently.  She had left knee replacement in April 2014 and at that time, her stools became harder. She has  also been on narcotics as well as NSAIDs, namely Voltaren as well as Aleve. She is still on both, but she has been cut back on her narcotics as well as NSAIDs.   The patient is quite concerned about any colonoscopy procedures since she developed an acute  right-sided hemiplegia the day after her colonoscopy and she was diagnosed with an ischemic stroke. She was found to have carotid artery stenosis with atheromatous plaques. She has been  on aspirin since then. She underwent her knee surgery under general anesthesia last month and  did not develop any neurological symptoms. She was able to stop her aspirin safely without any issues. However, her general anesthesia was complicated by hypotension as well as hypoxia that stabilized after few hours as per the patient.  5/25/21:  On 6/4/14, Dr. Larson performed a colonoscopy at the hospital that showed diverticulosis and hemorrhoids but no other lesions.  There were no complications.  She presents today for a 1-week history of diarrhea, usually after eating.  This has been associated with LLQ/left flank pain.  She has had some nausea.  Of note, Dr. Nj performed an EGD on 6/11/14 that showed some gastritis/peptic ulcers - thought to be secondary to bile reflux.  There was also some evidence of GERD.  Patient has been on a PPI for the past 2 weeks.  She is due for a screening colonoscopy due to a family history of colon cancer.  8/23/21 (Dr. Ulloa):  On 6/9/21, I performed a colonoscopy that revealed sigmoid diverticular disease and two benign colon polyps.  Patient presents for reevaluation of her GI symptoms.  She actually had some relief after she took the prep for the colonoscopy, but she tried the Xifaxan that I prescribed without any real change in her symptoms.  She still has intermittent LLQ/suprapubic pain, and she has urinary frequency, most likely exacerbated by her diuretics, which she takes for severe lower extremity edema.  She has some reflux symptoms, but as long as she takes Protonix, she is OK.  She was told recently to switch to Pepcid due to concerns about long-term use of PPIs.  4/8/22 (Evelin Rain, AUDREY): Ms. Reyes presents today for abdominal pain with constipation/diarrhea.  Patient reports she feels like she has a small stick pointing out of the epigastric area of her abdomen.  She reports her entire stomach is sore but pain is the worst in the left upper quadrant.  Patient states she had diverticulitis 8 years ago.  Patient reports she feels like she always has to have a bowel movement and she strains a lot.  She reports her stool is loose when she has a bowel movement.  She states she has bowel movement almost every day.  Patient also does reports she occasionally has accidents.  Patient is currently taking Pepcid 40 mg in the morning and Tums before bed.  She reports she still having issues with reflux at night.  5/12/22 (Evelin Rain, AUDREY): Ms. Reyes is a 71-year-old female who was last seen in GI clinic on 4/8/2022 for left upper quadrant pain.  Patient had a CT scan on 4/11/2022-nonobstructive left renal calculus.  Tiny gallstones.  Diverticulosis without changes of acute diverticulitis.  Nonspecific retroperitoneal lymph nodes without significant enlargement.  These are most likely reactive.  Patient reports she saw a surgeon for the gallstones who advised her that she did not need her gallbladder removed and it was not likely to be causing her pain.  Patient reports the pain is left upper quadrant and radiates to the middle.  Patient reports she cannot have any tight clothing on it.  Patient reports she also has nausea.  Patient additionally reports she has acid reflux.  She reports last night she had chili and her acid reflux was severe.  Patient has been taking Pepcid 40 mg at bedtime.  Patient also reports she is having constipation right now.  She took MiraLAX last night.  Patient reports she was taking three quarters of a capful for 2 weeks after she was last seen and had some diarrhea.  Patient does report she takes diclofenac once a day every day.  Patient reports her last EGD was many years ago.  Patient denies cardiac/kidney/lung disease, blood thinners, and home O2.  Patient is a diabetic.  10/3/22: Patient presents for follow-up of her GI issues, mainly rectal discomfort.  The patient states that for 2-3 days, she has had increased rectal discomfort that was "terrible" last night.  She has had problems with constipation - MiraLax helps.

## 2022-10-08 PROBLEM — 14760008: Status: ACTIVE | Noted: 2022-05-12

## 2022-10-08 PROBLEM — 312824007 FAMILY HISTORY OF CANCER OF COLON (SITUATION): Status: ACTIVE | Noted: 2021-05-24

## 2022-11-09 ENCOUNTER — TELEPHONE ENCOUNTER (OUTPATIENT)
Dept: URBAN - METROPOLITAN AREA CLINIC 19 | Facility: CLINIC | Age: 72
End: 2022-11-09

## 2022-11-09 RX ORDER — PANTOPRAZOLE SODIUM 40 MG/1
1 TABLET TABLET, DELAYED RELEASE ORAL ONCE A DAY
Qty: 90 TABLET | Refills: 1
Start: 2022-05-12

## 2023-01-06 ENCOUNTER — OFFICE VISIT (OUTPATIENT)
Dept: URBAN - METROPOLITAN AREA CLINIC 19 | Facility: CLINIC | Age: 73
End: 2023-01-06
Payer: MEDICARE

## 2023-01-06 VITALS
BODY MASS INDEX: 47.98 KG/M2 | WEIGHT: 288 LBS | DIASTOLIC BLOOD PRESSURE: 82 MMHG | HEIGHT: 65 IN | SYSTOLIC BLOOD PRESSURE: 138 MMHG | TEMPERATURE: 98.2 F

## 2023-01-06 DIAGNOSIS — K60.2 ANAL FISSURE: ICD-10-CM

## 2023-01-06 DIAGNOSIS — K21.9 GASTROESOPHAGEAL REFLUX DISEASE, UNSPECIFIED WHETHER ESOPHAGITIS PRESENT: ICD-10-CM

## 2023-01-06 DIAGNOSIS — R19.8 ALTERNATING CONSTIPATION AND DIARRHEA: ICD-10-CM

## 2023-01-06 PROBLEM — 249517009: Status: ACTIVE | Noted: 2022-04-08

## 2023-01-06 PROBLEM — 235595009: Status: ACTIVE | Noted: 2022-05-12

## 2023-01-06 PROBLEM — 30037006 ANAL FISSURE: Status: ACTIVE | Noted: 2023-01-06

## 2023-01-06 PROCEDURE — 99213 OFFICE O/P EST LOW 20 MIN: CPT | Performed by: INTERNAL MEDICINE

## 2023-01-06 RX ORDER — FAMOTIDINE 40 MG/1
1 TABLET TABLET, FILM COATED ORAL TWICE A DAY
Qty: 180 TABLET | Refills: 1 | Status: ACTIVE | COMMUNITY
Start: 2022-04-08

## 2023-01-06 RX ORDER — ONDANSETRON 4 MG/1
1 TABLET ON THE TONGUE AND ALLOW TO DISSOLVE TABLET, ORALLY DISINTEGRATING ORAL
Qty: 20 | Status: ACTIVE | COMMUNITY
Start: 2022-05-12

## 2023-01-06 RX ORDER — SERTRALINE 100 MG/1
1 TABLET TABLET, FILM COATED ORAL ONCE A DAY
Status: ACTIVE | COMMUNITY

## 2023-01-06 RX ORDER — FAMOTIDINE 40 MG/1
1 TABLET AT BEDTIME TABLET, FILM COATED ORAL TWICE A DAY
Qty: 180 TABLET | Refills: 1 | Status: ACTIVE | COMMUNITY
Start: 2021-08-23

## 2023-01-06 RX ORDER — LEVOTHYROXINE SODIUM 100 UG/1
1 TABLET IN THE MORNING ON AN EMPTY STOMACH TABLET ORAL ONCE A DAY
Status: ACTIVE | COMMUNITY

## 2023-01-06 RX ORDER — ONDANSETRON HYDROCHLORIDE 4 MG/1
1-2 TABLETS TABLET, FILM COATED ORAL
Qty: 60 | Refills: 1 | Status: ACTIVE | COMMUNITY
Start: 2021-05-24

## 2023-01-06 RX ORDER — PANTOPRAZOLE SODIUM 40 MG/1
1 TABLET TABLET, DELAYED RELEASE ORAL ONCE A DAY
Qty: 90 | Refills: 3
Start: 2022-05-12

## 2023-01-06 RX ORDER — PRAVASTATIN SODIUM 40 MG/1
1 TABLET TABLET ORAL ONCE A DAY
Status: ACTIVE | COMMUNITY

## 2023-01-06 RX ORDER — AMLODIPINE AND OLMESARTAN MEDOXOMIL 5; 20 MG/1; MG/1
1 TABLET TABLET ORAL ONCE A DAY
Status: ACTIVE | COMMUNITY

## 2023-01-06 RX ORDER — PANTOPRAZOLE SODIUM 40 MG/1
1 TABLET TABLET, DELAYED RELEASE ORAL ONCE A DAY
Qty: 90 TABLET | Refills: 1 | Status: ACTIVE | COMMUNITY
Start: 2022-05-12

## 2023-01-06 RX ORDER — FUROSEMIDE 40 MG/1
1 TABLET TABLET ORAL ONCE A DAY
Status: ACTIVE | COMMUNITY

## 2023-01-06 RX ORDER — FAMOTIDINE 40 MG/1
1 TABLET TABLET, FILM COATED ORAL TWICE A DAY
Qty: 180 TABLET | Refills: 3
Start: 2021-08-23

## 2023-01-06 RX ORDER — METFORMIN HYDROCHLORIDE 1000 MG/1
1 TABLET WITH A MEAL TABLET, FILM COATED ORAL ONCE A DAY
Status: ACTIVE | COMMUNITY

## 2023-01-06 NOTE — HPI-TODAY'S VISIT:
5/16/14 (Dr. Sadia Larson):  The patient is a 63-year-old woman here for complaints of left lower quadrant abdominal pain and hematochezia. She had a colonoscopy in 2007 by Dr. Kamaljit Morgan. It showed mild diverticulosis in the sigmoid colon and grade 2 internal hemorrhoids were found. The patient has had chronic left lower quadrant abdominal pain for over a year. She also had an attack of diverticulitis a couple of years ago when she was on  antibiotics orally for a week. Over the past year, she has also noticed her stools are thinner and  she has some discomfort in the left lower quadrant immediately before and after defecation. The pain is constant and gets worse with bowel movements. She denies any fevers or chills recently.  She had left knee replacement in April 2014 and at that time, her stools became harder. She has  also been on narcotics as well as NSAIDs, namely Voltaren as well as Aleve. She is still on both, but she has been cut back on her narcotics as well as NSAIDs.   The patient is quite concerned about any colonoscopy procedures since she developed an acute  right-sided hemiplegia the day after her colonoscopy and she was diagnosed with an ischemic stroke. She was found to have carotid artery stenosis with atheromatous plaques. She has been  on aspirin since then. She underwent her knee surgery under general anesthesia last month and  did not develop any neurological symptoms. She was able to stop her aspirin safely without any issues. However, her general anesthesia was complicated by hypotension as well as hypoxia that stabilized after few hours as per the patient.  5/25/21:  On 6/4/14, Dr. Larson performed a colonoscopy at the hospital that showed diverticulosis and hemorrhoids but no other lesions.  There were no complications.  She presents today for a 1-week history of diarrhea, usually after eating.  This has been associated with LLQ/left flank pain.  She has had some nausea.  Of note, Dr. Nj performed an EGD on 6/11/14 that showed some gastritis/peptic ulcers - thought to be secondary to bile reflux.  There was also some evidence of GERD.  Patient has been on a PPI for the past 2 weeks.  She is due for a screening colonoscopy due to a family history of colon cancer.  8/23/21 (Dr. Ulloa):  On 6/9/21, I performed a colonoscopy that revealed sigmoid diverticular disease and two benign colon polyps.  Patient presents for reevaluation of her GI symptoms.  She actually had some relief after she took the prep for the colonoscopy, but she tried the Xifaxan that I prescribed without any real change in her symptoms.  She still has intermittent LLQ/suprapubic pain, and she has urinary frequency, most likely exacerbated by her diuretics, which she takes for severe lower extremity edema.  She has some reflux symptoms, but as long as she takes Protonix, she is OK.  She was told recently to switch to Pepcid due to concerns about long-term use of PPIs.  4/8/22 (Evelin Rain, AUDREY): Ms. Reyes presents today for abdominal pain with constipation/diarrhea.  Patient reports she feels like she has a small stick pointing out of the epigastric area of her abdomen.  She reports her entire stomach is sore but pain is the worst in the left upper quadrant.  Patient states she had diverticulitis 8 years ago.  Patient reports she feels like she always has to have a bowel movement and she strains a lot.  She reports her stool is loose when she has a bowel movement.  She states she has bowel movement almost every day.  Patient also does reports she occasionally has accidents.  Patient is currently taking Pepcid 40 mg in the morning and Tums before bed.  She reports she still having issues with reflux at night.  5/12/22 (Evelin Rain, AUDREY): Ms. Reyes is a 71-year-old female who was last seen in GI clinic on 4/8/2022 for left upper quadrant pain.  Patient had a CT scan on 4/11/2022-nonobstructive left renal calculus.  Tiny gallstones.  Diverticulosis without changes of acute diverticulitis.  Nonspecific retroperitoneal lymph nodes without significant enlargement.  These are most likely reactive.  Patient reports she saw a surgeon for the gallstones who advised her that she did not need her gallbladder removed and it was not likely to be causing her pain.  Patient reports the pain is left upper quadrant and radiates to the middle.  Patient reports she cannot have any tight clothing on it.  Patient reports she also has nausea.  Patient additionally reports she has acid reflux.  She reports last night she had chili and her acid reflux was severe.  Patient has been taking Pepcid 40 mg at bedtime.  Patient also reports she is having constipation right now.  She took MiraLAX last night.  Patient reports she was taking three quarters of a capful for 2 weeks after she was last seen and had some diarrhea.  Patient does report she takes diclofenac once a day every day.  Patient reports her last EGD was many years ago.  Patient denies cardiac/kidney/lung disease, blood thinners, and home O2.  Patient is a diabetic.  10/3/22: Patient presents for follow-up of her GI issues, mainly rectal discomfort.  The patient states that for 2-3 days, she has had increased rectal discomfort that was "terrible" last night.  She has had problems with constipation - MiraLax helps.  1/6/23: Patient returns for reevaluation of her anal fissure. She had been taking nitroglycerine ointment since her last visit, and she does feel better. She still has occasional discomfort - she states that she is going to get her refill and use it for a little longer. She declined a repeat exam. Her reflux symptoms are under control with pantoprazole and famotidine.

## 2023-12-28 ENCOUNTER — P2P PATIENT RECORD (OUTPATIENT)
Age: 73
End: 2023-12-28

## 2024-01-28 ENCOUNTER — ERX REFILL RESPONSE (OUTPATIENT)
Dept: URBAN - METROPOLITAN AREA CLINIC 19 | Facility: CLINIC | Age: 74
End: 2024-01-28

## 2024-01-28 RX ORDER — FAMOTIDINE 40 MG/1
1 TABLET TABLET, FILM COATED ORAL TWICE A DAY
Qty: 180 TABLET | Refills: 3 | OUTPATIENT

## 2024-01-28 RX ORDER — FAMOTIDINE 40 MG/1
TAKE 1 TABLET BY MOUTH TWICE DAILY TABLET, FILM COATED ORAL
Qty: 180 TABLET | Refills: 3 | OUTPATIENT

## 2024-04-16 ENCOUNTER — OV EP (OUTPATIENT)
Dept: URBAN - METROPOLITAN AREA CLINIC 19 | Facility: CLINIC | Age: 74
End: 2024-04-16
Payer: MEDICARE

## 2024-04-16 VITALS
TEMPERATURE: 97.1 F | HEIGHT: 65 IN | BODY MASS INDEX: 43.92 KG/M2 | WEIGHT: 263.6 LBS | DIASTOLIC BLOOD PRESSURE: 68 MMHG | SYSTOLIC BLOOD PRESSURE: 134 MMHG | HEART RATE: 73 BPM

## 2024-04-16 DIAGNOSIS — K64.9 HEMORRHOIDS, UNSPECIFIED HEMORRHOID TYPE: ICD-10-CM

## 2024-04-16 DIAGNOSIS — K59.09 CHRONIC CONSTIPATION: ICD-10-CM

## 2024-04-16 DIAGNOSIS — Z86.010 HISTORY OF COLON POLYPS: ICD-10-CM

## 2024-04-16 PROCEDURE — 99213 OFFICE O/P EST LOW 20 MIN: CPT | Performed by: INTERNAL MEDICINE

## 2024-04-16 RX ORDER — LINACLOTIDE 145 UG/1
1 CAPSULE AT LEAST 30 MINUTES BEFORE THE FIRST MEAL OF THE DAY ON AN EMPTY STOMACH CAPSULE, GELATIN COATED ORAL ONCE A DAY
Qty: 90 | Refills: 3 | OUTPATIENT
Start: 2024-04-16 | End: 2025-04-11

## 2024-04-16 RX ORDER — HYDROCORTISONE ACETATE 25 MG/1
1 SUPPOSITORY SUPPOSITORY RECTAL ONCE A DAY
Qty: 30 | Refills: 1 | OUTPATIENT
Start: 2024-04-16 | End: 2024-06-15

## 2024-04-16 RX ORDER — ONDANSETRON 4 MG/1
1 TABLET ON THE TONGUE AND ALLOW TO DISSOLVE TABLET, ORALLY DISINTEGRATING ORAL
Qty: 20 | Status: ACTIVE | COMMUNITY
Start: 2022-05-12

## 2024-04-16 RX ORDER — PRAVASTATIN SODIUM 40 MG/1
1 TABLET TABLET ORAL ONCE A DAY
Status: ACTIVE | COMMUNITY

## 2024-04-16 RX ORDER — FAMOTIDINE 40 MG/1
TAKE 1 TABLET BY MOUTH TWICE DAILY TABLET, FILM COATED ORAL
Qty: 180 TABLET | Refills: 3 | Status: ACTIVE | COMMUNITY

## 2024-04-16 RX ORDER — PANTOPRAZOLE 40 MG/1
TAKE 1 TABLET BY MOUTH ONCE DAILY TABLET, DELAYED RELEASE ORAL
Qty: 90 TABLET | Refills: 3 | Status: ACTIVE | COMMUNITY

## 2024-04-16 RX ORDER — LEVOTHYROXINE SODIUM 100 UG/1
1 TABLET IN THE MORNING ON AN EMPTY STOMACH TABLET ORAL ONCE A DAY
Status: ACTIVE | COMMUNITY

## 2024-04-16 RX ORDER — FUROSEMIDE 40 MG/1
1 TABLET TABLET ORAL ONCE A DAY
Status: ACTIVE | COMMUNITY

## 2024-04-16 RX ORDER — ONDANSETRON HYDROCHLORIDE 4 MG/1
1-2 TABLETS TABLET, FILM COATED ORAL
Qty: 60 | Refills: 1 | Status: ACTIVE | COMMUNITY
Start: 2021-05-24

## 2024-04-16 RX ORDER — METFORMIN HYDROCHLORIDE 1000 MG/1
1 TABLET WITH A MEAL TABLET, FILM COATED ORAL ONCE A DAY
Status: ACTIVE | COMMUNITY

## 2024-04-16 RX ORDER — SERTRALINE 100 MG/1
1 TABLET TABLET, FILM COATED ORAL ONCE A DAY
Status: ACTIVE | COMMUNITY

## 2024-04-16 RX ORDER — AMLODIPINE AND OLMESARTAN MEDOXOMIL 5; 20 MG/1; MG/1
1 TABLET TABLET ORAL ONCE A DAY
Status: ACTIVE | COMMUNITY

## 2024-04-16 NOTE — PHYSICAL EXAM CHEST:
no lesions,  no deformities,  no traumatic injuries, chest wall non-tender,  no masses present, breathing is unlabored without accessory muscle use, normal breath sounds, on chronic oxygen by nasal cannula

## 2024-04-16 NOTE — HPI-TODAY'S VISIT:
5/16/14 (Dr. Sadia Larson):  The patient is a 63-year-old woman here for complaints of left lower quadrant abdominal pain and hematochezia. She had a colonoscopy in 2007 by Dr. Kamaljit Morgan. It showed mild diverticulosis in the sigmoid colon and grade 2 internal hemorrhoids were found. The patient has had chronic left lower quadrant abdominal pain for over a year. She also had an attack of diverticulitis a couple of years ago when she was on  antibiotics orally for a week. Over the past year, she has also noticed her stools are thinner and  she has some discomfort in the left lower quadrant immediately before and after defecation. The pain is constant and gets worse with bowel movements. She denies any fevers or chills recently.  She had left knee replacement in April 2014 and at that time, her stools became harder. She has  also been on narcotics as well as NSAIDs, namely Voltaren as well as Aleve. She is still on both, but she has been cut back on her narcotics as well as NSAIDs.  The patient is quite concerned about any colonoscopy procedures since she developed an acute  right-sided hemiplegia the day after her colonoscopy and she was diagnosed with an ischemic stroke. She was found to have carotid artery stenosis with atheromatous plaques. She has been  on aspirin since then. She underwent her knee surgery under general anesthesia last month and  did not develop any neurological symptoms. She was able to stop her aspirin safely without any issues. However, her general anesthesia was complicated by hypotension as well as hypoxia that stabilized after few hours as per the patient.  5/25/21:  On 6/4/14, Dr. Larson performed a colonoscopy at the hospital that showed diverticulosis and hemorrhoids but no other lesions.  There were no complications.  She presents today for a 1-week history of diarrhea, usually after eating.  This has been associated with LLQ/left flank pain.  She has had some nausea.  Of note, Dr. Nj performed an EGD on 6/11/14 that showed some gastritis/peptic ulcers - thought to be secondary to bile reflux.  There was also some evidence of GERD.  Patient has been on a PPI for the past 2 weeks.  She is due for a screening colonoscopy due to a family history of colon cancer.  8/23/21 (Dr. Ulloa):  On 6/9/21, I performed a colonoscopy that revealed sigmoid diverticular disease and two benign colon polyps.  Patient presents for reevaluation of her GI symptoms.  She actually had some relief after she took the prep for the colonoscopy, but she tried the Xifaxan that I prescribed without any real change in her symptoms.  She still has intermittent LLQ/suprapubic pain, and she has urinary frequency, most likely exacerbated by her diuretics, which she takes for severe lower extremity edema.  She has some reflux symptoms, but as long as she takes Protonix, she is OK.  She was told recently to switch to Pepcid due to concerns about long-term use of PPIs.  4/8/22 (Evelin Rain, AUDREY): Ms. Reyes presents today for abdominal pain with constipation/diarrhea.  Patient reports she feels like she has a small stick pointing out of the epigastric area of her abdomen.  She reports her entire stomach is sore but pain is the worst in the left upper quadrant.  Patient states she had diverticulitis 8 years ago.  Patient reports she feels like she always has to have a bowel movement and she strains a lot.  She reports her stool is loose when she has a bowel movement.  She states she has bowel movement almost every day.  Patient also does reports she occasionally has accidents.  Patient is currently taking Pepcid 40 mg in the morning and Tums before bed.  She reports she still having issues with reflux at night.  5/12/22 (Evelin Rain, AUDREY): Ms. Reyes is a 71-year-old female who was last seen in GI clinic on 4/8/2022 for left upper quadrant pain.  Patient had a CT scan on 4/11/2022-nonobstructive left renal calculus.  Tiny gallstones.  Diverticulosis without changes of acute diverticulitis.  Nonspecific retroperitoneal lymph nodes without significant enlargement.  These are most likely reactive.  Patient reports she saw a surgeon for the gallstones who advised her that she did not need her gallbladder removed and it was not likely to be causing her pain.  Patient reports the pain is left upper quadrant and radiates to the middle.  Patient reports she cannot have any tight clothing on it.  Patient reports she also has nausea.  Patient additionally reports she has acid reflux.  She reports last night she had chili and her acid reflux was severe.  Patient has been taking Pepcid 40 mg at bedtime.  Patient also reports she is having constipation right now.  She took MiraLAX last night.  Patient reports she was taking three quarters of a capful for 2 weeks after she was last seen and had some diarrhea.  Patient does report she takes diclofenac once a day every day.  Patient reports her last EGD was many years ago.  Patient denies cardiac/kidney/lung disease, blood thinners, and home O2.  Patient is a diabetic.  10/3/22: Patient presents for follow-up of her GI issues, mainly rectal discomfort.  The patient states that for 2-3 days, she has had increased rectal discomfort that was "terrible" last night.  She has had problems with constipation - MiraLax helps.  1/6/23: Patient returns for reevaluation of her anal fissure. She had been taking nitroglycerine ointment since her last visit, and she does feel better. She still has occasional discomfort - she states that she is going to get her refill and use it for a little longer. She declined a repeat exam. Her reflux symptoms are under control with pantoprazole and famotidine.  4/17/24: Patient presents for reevaluation of her constipation, most likely chronic idiopathic constipation. She is also having some anorectal discomfort. She is now on chronic oxygen - she has a nasal cannula today. She thought that she was due for a colonoscopy - as noted above, I performed her last colonoscopy on 6/9/21 - I removed two benign colon polyps and found incidental diverticulosis. We discussed treating her constipation - she will be due for a surveillance colonoscopy in 2026. Given her pulmonary function, I would hesitate to recommend any further surveillance at this time, but she can be reassessed in two years.

## 2024-07-16 ENCOUNTER — DASHBOARD ENCOUNTERS (OUTPATIENT)
Age: 74
End: 2024-07-16

## 2024-07-16 ENCOUNTER — OFFICE VISIT (OUTPATIENT)
Dept: URBAN - METROPOLITAN AREA CLINIC 19 | Facility: CLINIC | Age: 74
End: 2024-07-16
Payer: MEDICARE

## 2024-07-16 VITALS
DIASTOLIC BLOOD PRESSURE: 70 MMHG | HEIGHT: 65 IN | WEIGHT: 261 LBS | BODY MASS INDEX: 43.49 KG/M2 | SYSTOLIC BLOOD PRESSURE: 130 MMHG | HEART RATE: 86 BPM | TEMPERATURE: 98.6 F

## 2024-07-16 DIAGNOSIS — K59.09 CHRONIC CONSTIPATION: ICD-10-CM

## 2024-07-16 PROCEDURE — 99212 OFFICE O/P EST SF 10 MIN: CPT | Performed by: NURSE PRACTITIONER

## 2024-07-16 RX ORDER — PANTOPRAZOLE 40 MG/1
TAKE 1 TABLET BY MOUTH ONCE DAILY TABLET, DELAYED RELEASE ORAL
Qty: 90 TABLET | Refills: 3 | Status: ACTIVE | COMMUNITY

## 2024-07-16 RX ORDER — FUROSEMIDE 40 MG/1
1 TABLET TABLET ORAL ONCE A DAY
Status: ACTIVE | COMMUNITY

## 2024-07-16 RX ORDER — AMLODIPINE AND OLMESARTAN MEDOXOMIL 5; 20 MG/1; MG/1
1 TABLET TABLET ORAL ONCE A DAY
Status: DISCONTINUED | COMMUNITY

## 2024-07-16 RX ORDER — LINACLOTIDE 145 UG/1
1 CAPSULE AT LEAST 30 MINUTES BEFORE THE FIRST MEAL OF THE DAY ON AN EMPTY STOMACH CAPSULE, GELATIN COATED ORAL ONCE A DAY
Qty: 90 | Refills: 3 | Status: DISCONTINUED | COMMUNITY
Start: 2024-04-16 | End: 2025-04-11

## 2024-07-16 RX ORDER — PRAVASTATIN SODIUM 40 MG/1
1 TABLET TABLET ORAL ONCE A DAY
Status: ACTIVE | COMMUNITY

## 2024-07-16 RX ORDER — ONDANSETRON HYDROCHLORIDE 4 MG/1
1-2 TABLETS TABLET, FILM COATED ORAL
Qty: 60 | Refills: 1 | Status: DISCONTINUED | COMMUNITY
Start: 2021-05-24

## 2024-07-16 RX ORDER — METFORMIN HYDROCHLORIDE 1000 MG/1
1 TABLET WITH A MEAL TABLET, FILM COATED ORAL ONCE A DAY
Status: ACTIVE | COMMUNITY

## 2024-07-16 RX ORDER — FAMOTIDINE 40 MG/1
TAKE 1 TABLET BY MOUTH TWICE DAILY TABLET, FILM COATED ORAL
Qty: 180 TABLET | Refills: 3 | Status: ACTIVE | COMMUNITY

## 2024-07-16 RX ORDER — SERTRALINE 100 MG/1
1 TABLET TABLET, FILM COATED ORAL ONCE A DAY
Status: ACTIVE | COMMUNITY

## 2024-07-16 RX ORDER — LEVOTHYROXINE SODIUM 100 UG/1
1 TABLET IN THE MORNING ON AN EMPTY STOMACH TABLET ORAL ONCE A DAY
Status: ACTIVE | COMMUNITY

## 2024-07-16 RX ORDER — ONDANSETRON 4 MG/1
1 TABLET ON THE TONGUE AND ALLOW TO DISSOLVE TABLET, ORALLY DISINTEGRATING ORAL
Qty: 20 | Status: DISCONTINUED | COMMUNITY
Start: 2022-05-12

## 2024-07-16 NOTE — HPI-TODAY'S VISIT:
5/16/14 (Dr. Sadia Larson): The patient is a 63-year-old woman here for complaints of left lower quadrant abdominal pain and hematochezia. She had a colonoscopy in 2007 by Dr. Kamaljit Morgan. It showed mild diverticulosis in the sigmoid colon and grade 2 internal hemorrhoids were found. The patient has had chronic left lower quadrant abdominal pain for over a year. She also had an attack of diverticulitis a couple of years ago when she was on antibiotics orally for a week. Over the past year, she has also noticed her stools are thinner and she has some discomfort in the left lower quadrant immediately before and after defecation. The pain is constant and gets worse with bowel movements. She denies any fevers or chills recently. She had left knee replacement in April 2014 and at that time, her stools became harder. She has also been on narcotics as well as NSAIDs, namely Voltaren as well as Aleve. She is still on both, but she has been cut back on her narcotics as well as NSAIDs.  The patient is quite concerned about any colonoscopy procedures since she developed an acute right-sided hemiplegia the day after her colonoscopy and she was diagnosed with an ischemic stroke. She was found to have carotid artery stenosis with atheromatous plaques. She has been on aspirin since then. She underwent her knee surgery under general anesthesia last month and did not develop any neurological symptoms. She was able to stop her aspirin safely without any issues. However, her general anesthesia was complicated by hypotension as well as hypoxia that stabilized after few hours as per the patient.  5/25/21: On 6/4/14, Dr. Larson performed a colonoscopy at the hospital that showed diverticulosis and hemorrhoids but no other lesions. There were no complications. She presents today for a 1-week history of diarrhea, usually after eating. This has been associated with LLQ/left flank pain. She has had some nausea. Of note, Dr. Nj performed an EGD on 6/11/14 that showed some gastritis/peptic ulcers - thought to be secondary to bile reflux. There was also some evidence of GERD. Patient has been on a PPI for the past 2 weeks. She is due for a screening colonoscopy due to a family history of colon cancer.  8/23/21 (Dr. Ulloa): On 6/9/21, I performed a colonoscopy that revealed sigmoid diverticular disease and two benign colon polyps.  Patient presents for reevaluation of her GI symptoms. She actually had some relief after she took the prep for the colonoscopy, but she tried the Xifaxan that I prescribed without any real change in her symptoms. She still has intermittent LLQ/suprapubic pain, and she has urinary frequency, most likely exacerbated by her diuretics, which she takes for severe lower extremity edema. She has some reflux symptoms, but as long as she takes Protonix, she is OK. She was told recently to switch to Pepcid due to concerns about long-term use of PPIs.  4/8/22 (Evelin Rain, AUDREY): Ms. Reyes presents today for abdominal pain with constipation/diarrhea. Patient reports she feels like she has a small stick pointing out of the epigastric area of her abdomen. She reports her entire stomach is sore but pain is the worst in the left upper quadrant. Patient states she had diverticulitis 8 years ago. Patient reports she feels like she always has to have a bowel movement and she strains a lot. She reports her stool is loose when she has a bowel movement. She states she has bowel movement almost every day. Patient also does reports she occasionally has accidents. Patient is currently taking Pepcid 40 mg in the morning and Tums before bed. She reports she still having issues with reflux at night.  5/12/22 (Evelin Rain, AUDREY): Ms. Reyes is a 71-year-old female who was last seen in GI clinic on 4/8/2022 for left upper quadrant pain. Patient had a CT scan on 4/11/2022-nonobstructive left renal calculus. Tiny gallstones. Diverticulosis without changes of acute diverticulitis. Nonspecific retroperitoneal lymph nodes without significant enlargement. These are most likely reactive. Patient reports she saw a surgeon for the gallstones who advised her that she did not need her gallbladder removed and it was not likely to be causing her pain. Patient reports the pain is left upper quadrant and radiates to the middle. Patient reports she cannot have any tight clothing on it. Patient reports she also has nausea. Patient additionally reports she has acid reflux. She reports last night she had chili and her acid reflux was severe. Patient has been taking Pepcid 40 mg at bedtime. Patient also reports she is having constipation right now. She took MiraLAX last night. Patient reports she was taking three quarters of a capful for 2 weeks after she was last seen and had some diarrhea. Patient does report she takes diclofenac once a day every day. Patient reports her last EGD was many years ago. Patient denies cardiac/kidney/lung disease, blood thinners, and home O2. Patient is a diabetic.  10/3/22: Patient presents for follow-up of her GI issues, mainly rectal discomfort. The patient states that for 2-3 days, she has had increased rectal discomfort that was "terrible" last night. She has had problems with constipation - MiraLax helps.  1/6/23: Patient returns for reevaluation of her anal fissure. She had been taking nitroglycerine ointment since her last visit, and she does feel better. She still has occasional discomfort - she states that she is going to get her refill and use it for a little longer. She declined a repeat exam. Her reflux symptoms are under control with pantoprazole and famotidine.  4/17/24: Patient presents for reevaluation of her constipation, most likely chronic idiopathic constipation. She is also having some anorectal discomfort. She is now on chronic oxygen - she has a nasal cannula today. She thought that she was due for a colonoscopy - as noted above, I performed her last colonoscopy on 6/9/21 - I removed two benign colon polyps and found incidental diverticulosis. We discussed treating her constipation - she will be due for a surveillance colonoscopy in 2026. Given her pulmonary function, I would hesitate to recommend any further surveillance at this time, but she can be reassessed in two years.   7/16/2024 (Jasmyn): Her for constipation. Last visit she was given Linzess 145 and anusol. She states she never took the linzess due to fear of diarrhea. Has been on Ozempic for the past year. Taking a stool softener which seems to help her have a BM everyday but does not feel like she is getting "cleaned out." Feels bloated and passes a lot of gas. Tried miralax in the past and states it worked only sometimes.

## 2024-07-16 NOTE — PHYSICAL EXAM GASTROINTESTINAL
Abdomen , soft, nontender, nondistended , no guarding or rigidity , Liver and Spleen,  no hepatosplenomegaly

## 2024-07-16 NOTE — PHYSICAL EXAM CONSTITUTIONAL:
well developed, well nourished , in no acute distress ,  ambulating without difficulty,  normal communication ability , 
boni rn

## 2025-02-21 ENCOUNTER — TELEPHONE ENCOUNTER (OUTPATIENT)
Dept: URBAN - METROPOLITAN AREA CLINIC 19 | Facility: CLINIC | Age: 75
End: 2025-02-21

## 2025-02-21 RX ORDER — FAMOTIDINE 40 MG/1
1 TABLET AT BEDTIME TABLET, FILM COATED ORAL ONCE A DAY
Qty: 90 TABLET | Refills: 3

## 2025-02-21 RX ORDER — PANTOPRAZOLE 40 MG/1
1 TABLET 1/2 TO 1 HOUR BEFORE MORNING MEAL TABLET, DELAYED RELEASE ORAL ONCE A DAY
Qty: 90 TABLET | Refills: 3

## 2025-02-22 ENCOUNTER — ERX REFILL RESPONSE (OUTPATIENT)
Dept: URBAN - METROPOLITAN AREA CLINIC 19 | Facility: CLINIC | Age: 75
End: 2025-02-22

## 2025-02-22 RX ORDER — PANTOPRAZOLE 40 MG/1
1 TABLET 1/2 TO 1 HOUR BEFORE MORNING MEAL TABLET, DELAYED RELEASE ORAL ONCE A DAY
Qty: 90 TABLET | Refills: 3 | OUTPATIENT

## 2025-02-22 RX ORDER — FAMOTIDINE 40 MG/1
1 TABLET AT BEDTIME TABLET, FILM COATED ORAL ONCE A DAY
Qty: 90 TABLET | Refills: 3 | OUTPATIENT